# Patient Record
Sex: MALE | Race: WHITE | URBAN - METROPOLITAN AREA
[De-identification: names, ages, dates, MRNs, and addresses within clinical notes are randomized per-mention and may not be internally consistent; named-entity substitution may affect disease eponyms.]

---

## 2019-08-02 ENCOUNTER — TRANSFERRED RECORDS (OUTPATIENT)
Dept: HEALTH INFORMATION MANAGEMENT | Facility: CLINIC | Age: 29
End: 2019-08-02

## 2020-02-06 ENCOUNTER — APPOINTMENT (OUTPATIENT)
Age: 30
Setting detail: DERMATOLOGY
End: 2020-02-06

## 2020-02-06 DIAGNOSIS — R20.8 OTHER DISTURBANCES OF SKIN SENSATION: ICD-10-CM

## 2020-02-06 DIAGNOSIS — L64.8 OTHER ANDROGENIC ALOPECIA: ICD-10-CM

## 2020-02-06 PROCEDURE — OTHER COUNSELING: OTHER

## 2020-02-06 PROCEDURE — 99213 OFFICE O/P EST LOW 20 MIN: CPT

## 2020-02-06 PROCEDURE — OTHER PRESCRIPTION: OTHER

## 2020-02-06 ASSESSMENT — LOCATION SIMPLE DESCRIPTION DERM
LOCATION SIMPLE: SCALP
LOCATION SIMPLE: LEFT SCALP

## 2020-02-06 ASSESSMENT — LOCATION ZONE DERM: LOCATION ZONE: SCALP

## 2020-02-06 ASSESSMENT — LOCATION DETAILED DESCRIPTION DERM
LOCATION DETAILED: LEFT SUPERIOR PARIETAL SCALP
LOCATION DETAILED: LEFT MEDIAL FRONTAL SCALP

## 2020-02-06 NOTE — HPI: FOLLOW UP OTHER
What Is Your Reason For Requesting A Follow-Up Appointment?: Patient is here today for follow up on Finasteride, staes it continues to work great and would like refills.

## 2022-02-08 ENCOUNTER — APPOINTMENT (OUTPATIENT)
Dept: URBAN - METROPOLITAN AREA CLINIC 260 | Age: 32
Setting detail: DERMATOLOGY
End: 2022-02-09

## 2022-02-08 VITALS — HEIGHT: 73 IN | WEIGHT: 205 LBS

## 2022-02-08 DIAGNOSIS — L40.8 OTHER PSORIASIS: ICD-10-CM

## 2022-02-08 PROCEDURE — OTHER PRESCRIPTION MEDICATION MANAGEMENT: OTHER

## 2022-02-08 PROCEDURE — OTHER COUNSELING: OTHER

## 2022-02-08 PROCEDURE — OTHER MIPS QUALITY: OTHER

## 2022-02-08 PROCEDURE — OTHER PRESCRIPTION: OTHER

## 2022-02-08 PROCEDURE — 99214 OFFICE O/P EST MOD 30 MIN: CPT

## 2022-02-08 RX ORDER — CLOBETASOL PROPIONATE 0.5 MG/G
OINTMENT TOPICAL
Qty: 45 | Refills: 0 | Status: ERX | COMMUNITY
Start: 2022-02-08

## 2022-02-08 RX ORDER — CLOBETASOL PROPIONATE 0.5 MG/ML
SOLUTION TOPICAL
Qty: 50 | Refills: 0 | Status: ERX | COMMUNITY
Start: 2022-02-08

## 2022-02-08 ASSESSMENT — LOCATION ZONE DERM
LOCATION ZONE: SCALP
LOCATION ZONE: NECK
LOCATION ZONE: TRUNK
LOCATION ZONE: EAR

## 2022-02-08 ASSESSMENT — LOCATION SIMPLE DESCRIPTION DERM
LOCATION SIMPLE: LEFT ANTERIOR NECK
LOCATION SIMPLE: RIGHT EAR
LOCATION SIMPLE: GLUTEAL CLEFT
LOCATION SIMPLE: POSTERIOR SCALP
LOCATION SIMPLE: LEFT EAR

## 2022-02-08 ASSESSMENT — PGA PSORIASIS: PGA PSORIASIS 2020: MODERATE

## 2022-02-08 ASSESSMENT — LOCATION DETAILED DESCRIPTION DERM
LOCATION DETAILED: GLUTEAL CLEFT
LOCATION DETAILED: LEFT SUPERIOR ANTERIOR NECK
LOCATION DETAILED: POSTERIOR MID-PARIETAL SCALP
LOCATION DETAILED: RIGHT CYMBA CONCHA
LOCATION DETAILED: LEFT ANTIHELIX

## 2022-02-08 NOTE — PROCEDURE: PRESCRIPTION MEDICATION MANAGEMENT
Detail Level: Simple
Plan: Return if not improved in 2 weeks
Initiate Treatment: Clobetasol scalp solution and ointment
Render In Strict Bullet Format?: No

## 2022-02-08 NOTE — PROCEDURE: MIPS QUALITY
Quality 130: Documentation Of Current Medications In The Medical Record: Current Medications Documented
Quality 226: Preventive Care And Screening: Tobacco Use: Screening And Cessation Intervention: Patient screened for tobacco use and is an ex/non-smoker
Quality 110: Preventive Care And Screening: Influenza Immunization: Influenza Immunization not Administered because Patient Refused.
Quality 431: Preventive Care And Screening: Unhealthy Alcohol Use - Screening: Patient not identified as an unhealthy alcohol user when screened for unhealthy alcohol use using a systematic screening method
Detail Level: Detailed

## 2022-03-15 RX ORDER — CLOBETASOL PROPIONATE 0.5 MG/G
OINTMENT TOPICAL
Qty: 45 | Refills: 2 | Status: ERX

## 2023-04-27 ENCOUNTER — TRANSFERRED RECORDS (OUTPATIENT)
Dept: HEALTH INFORMATION MANAGEMENT | Facility: CLINIC | Age: 33
End: 2023-04-27

## 2023-10-26 ENCOUNTER — TRANSFERRED RECORDS (OUTPATIENT)
Dept: HEALTH INFORMATION MANAGEMENT | Facility: CLINIC | Age: 33
End: 2023-10-26

## 2023-11-01 ENCOUNTER — TRANSFERRED RECORDS (OUTPATIENT)
Dept: HEALTH INFORMATION MANAGEMENT | Facility: CLINIC | Age: 33
End: 2023-11-01
Payer: COMMERCIAL

## 2023-11-07 ENCOUNTER — TRANSFERRED RECORDS (OUTPATIENT)
Dept: HEALTH INFORMATION MANAGEMENT | Facility: CLINIC | Age: 33
End: 2023-11-07
Payer: COMMERCIAL

## 2023-11-16 ENCOUNTER — TRANSFERRED RECORDS (OUTPATIENT)
Dept: HEALTH INFORMATION MANAGEMENT | Facility: CLINIC | Age: 33
End: 2023-11-16
Payer: COMMERCIAL

## 2023-11-30 ENCOUNTER — TRANSFERRED RECORDS (OUTPATIENT)
Dept: HEALTH INFORMATION MANAGEMENT | Facility: CLINIC | Age: 33
End: 2023-11-30
Payer: COMMERCIAL

## 2023-12-01 ENCOUNTER — TRANSCRIBE ORDERS (OUTPATIENT)
Dept: OTHER | Age: 33
End: 2023-12-01

## 2023-12-01 DIAGNOSIS — H90.3 SENSORINEURAL HEARING LOSS, BILATERAL: Primary | ICD-10-CM

## 2023-12-05 NOTE — TELEPHONE ENCOUNTER
FUTURE VISIT INFORMATION      FUTURE VISIT INFORMATION:  Date: 2/7/2024  Time: 9 AM  Location: [facility]  REFERRAL INFORMATION:  Referring provider:  Nancy Machado MD   Referring providers clinic:  [facility]  Reason for visit/diagnosis:  H90.3 (ICD-10-CM) - Sensorineural hearing loss, bilateral, Pt was seen at [facility] and has had CT scans done with them. He will have that information sent to [employer]. Pt said he also has psoriasis and sinus infections and doesn't know if that is affecting his hearing loss. Pt made appt for [facility] location     RECORDS REQUESTED FROM      Clinic name Comments Records Status Imaging Status   [facility] 11/30/23 referral  10/26/23 and 11/7/23 OV notes- Nancy Machado MD     Audiograms: 10/26/23, 11/7/23 Scanned in [facility] 11/1/23 audiogram Scanned in [facility]    [facility] MRN: [id number] 11/16/23 MR brain IAC Scanned in Epic Pending req  Req 1/5/24  PACS           January 5, 2024 7:05 AM - image requested from [facility]  January 9, 2024 5:23 PM - Image resolved in [facility]

## 2024-02-01 DIAGNOSIS — Z01.10 ENCOUNTER FOR HEARING EXAMINATION: Primary | ICD-10-CM

## 2024-02-05 ENCOUNTER — TRANSCRIBE ORDERS (OUTPATIENT)
Dept: OTHER | Age: 34
End: 2024-02-05

## 2024-02-05 DIAGNOSIS — H93.12 TINNITUS, LEFT: ICD-10-CM

## 2024-02-05 DIAGNOSIS — H91.92 HEARING LOSS, LEFT: Primary | ICD-10-CM

## 2024-02-07 ENCOUNTER — OFFICE VISIT (OUTPATIENT)
Dept: AUDIOLOGY | Facility: CLINIC | Age: 34
End: 2024-02-07
Payer: COMMERCIAL

## 2024-02-07 ENCOUNTER — PRE VISIT (OUTPATIENT)
Dept: OTOLARYNGOLOGY | Facility: CLINIC | Age: 34
End: 2024-02-07

## 2024-02-07 ENCOUNTER — OFFICE VISIT (OUTPATIENT)
Dept: OTOLARYNGOLOGY | Facility: CLINIC | Age: 34
End: 2024-02-07
Payer: COMMERCIAL

## 2024-02-07 VITALS
TEMPERATURE: 98.5 F | BODY MASS INDEX: 28.23 KG/M2 | SYSTOLIC BLOOD PRESSURE: 153 MMHG | HEIGHT: 73 IN | WEIGHT: 213 LBS | HEART RATE: 79 BPM | DIASTOLIC BLOOD PRESSURE: 93 MMHG | OXYGEN SATURATION: 99 %

## 2024-02-07 DIAGNOSIS — H90.A22 SENSORINEURAL HEARING LOSS (SNHL) OF LEFT EAR WITH RESTRICTED HEARING OF RIGHT EAR: ICD-10-CM

## 2024-02-07 DIAGNOSIS — Z01.10 ENCOUNTER FOR HEARING EXAMINATION: ICD-10-CM

## 2024-02-07 DIAGNOSIS — H90.3 SENSORINEURAL HEARING LOSS, BILATERAL: Primary | ICD-10-CM

## 2024-02-07 PROCEDURE — 92557 COMPREHENSIVE HEARING TEST: CPT | Performed by: AUDIOLOGIST

## 2024-02-07 PROCEDURE — 92565 STENGER TEST PURE TONE: CPT | Performed by: AUDIOLOGIST

## 2024-02-07 PROCEDURE — 99243 OFF/OP CNSLTJ NEW/EST LOW 30: CPT | Performed by: OTOLARYNGOLOGY

## 2024-02-07 PROCEDURE — 92550 TYMPANOMETRY & REFLEX THRESH: CPT | Performed by: AUDIOLOGIST

## 2024-02-07 RX ORDER — FINASTERIDE 1 MG/1
1 TABLET, FILM COATED ORAL EVERY MORNING
COMMUNITY

## 2024-02-07 ASSESSMENT — PAIN SCALES - GENERAL: PAINLEVEL: NO PAIN (0)

## 2024-02-07 NOTE — PROGRESS NOTES
AUDIOLOGY REPORT    SUMMARY: Audiology visit completed. See audiogram for results.      RECOMMENDATIONS: Follow-up with ENT.    Diane Connolly, [facility]  Licensed Audiologist  [place] #[id number]

## 2024-02-07 NOTE — PATIENT INSTRUCTIONS
You were seen in the ENT Clinic today by Dr. Pastor. If you have any questions or concerns after your appointment, please contact us (see below)      2.   Please let us know if you'd like to proceed with cochlear implant. Below outlines your next steps and surgery information if you plan to go forward with surgery.        Cochlear Implant Pre-Operative and Post-Operative Instructions:     Our surgery schedulers, will contact you with a potential date for surgery after obtaining prior authorization for cochlear implant surgery. The Prior Authorization Process can take 4-6 weeks, and schedulers are unable to call you until it is finalized through this process.        PRE-PROCEDURE HIGHLIGHTS: COCHLEAR IMPLANT     1.PNEUMOCOCCAL VACCINATIONS - summary below:  Meningitis is an infection of the fluid that surrounds the brain and spinal cord. There are two main types of meningitis, viral and bacterial. Bacterial meningitis is the more serious type and the type that has been reported in individuals with cochlear implants. The symptoms, treatment, and outcomes may differ depending on the cause of the meningitis. Meningitis in individuals with cochlear implants is most commonly caused by the bacterium Streptococcus pneumoniae (pneumococcus). Pneumococcal vaccination protects against Streptococcus pneumoniae (commonly referred to as pneumococcus), the bacterium most commonly responsible for meningitis after cochlear implant surgery. Although extremely rare, meningitis has been reported worldwide in patients with cochlear implants, and can be immensely serious. To minimize the risk of infection, we require all cochlear implant patients to receive age-appropriate pneumococcal vaccination and to provide us with proof of vaccination. We are taking these precautions in accordance with guidelines established by the Food and Drug Administration (FDA), Center for Disease Control (CDC), and state health departments.     When should I  get the vaccines?  Your age and shot history affect which vaccine you should get and when. Talk to your provider. The vaccine guidelines that we follow are from the Centers for Disease Control (CDC).      Vaccine guidelines for adults getting a cochlear implant:  If you have never had a pneumococcal vaccine, you should get the PCV20 (Fjpqpvx42) or PCV15 (Vaxneuvance).   If you get the PCV20, you do not need any other vaccines.   If you get the PCV15, you will need a second vaccine (the Pneumovax 23) at least 8 weeks later.   If you have previously received a dose of the Pneumovax 23:   You should get 1 dose of the PCV20 (Jlxkkjf93) or PCV15 (Vaxneuvance) at least 1 year after receiving the Pneumovax 23.   If you have previously received a dose of the Uyfweqb34:   You should get 1 dose of the Iyubrlsvx36 at least 8 weeks after receiving the Enoyvdm53.   If you have previously received both the Ccrfaghqr04 and Iiepzii34:   You do not need any other vaccines. Together, Zfinzlw11 and Uadyboqlh36 meet the meningitis vaccine guidelines for cochlear implant     **Finishing the vaccination process is important, but may need to be finished after surgery due to the recommended vaccine schedule- may need to be one year apart (see vaccine handout above).     To learn more:  Talk to your provider. You can also visit these CDC websites:  https://www.cdc.gov/vaccines/vpd/pneumo/public/index.html  https://www.cdc.gov/vaccines/vpd/pneumo/hcp/xhq-zrzt-qs-vaccinate.html  http://www.cdc.gov/vaccines/vpd-vac/mening/cochlear/yva-kppownpu-tif-hcp.htm         **Please have your clinic fax us the proof of immunizations  [phone].Cochlear implant candidates must have completed at least part of the immunization requirements prior to surgery. Full compliance with the full immunization schedule must be documented as soon as possible.        2. HISTORY AND PHYSICAL: You must have a physical exam (called  history and physical ) within 30 days  of surgery. You can do this at the Nurse Practitioner Clinic here at the [facility] or your family clinic. Your doctor will let you know if you need to have this physical completed by our PAC (Preoperative Assessment Center) team at the [facility]. Bring the paper copy of the Pre-Op Surgery Form with you to your primary doctor if they are outside of the [facility] system, as this paper form includes our fax number.     3. NO MOTRIN, IBUPROFEN, ASPIRIN, ALEVE, GARLIC SUPPLEMENTS or FISH OIL x 7 days prior to surgery ( to prevent excess bleeding and bruising at time of surgery)     4. Review written material in Cochlear implant surgery teaching packet and always feel free contact us for further questions.      5. Audiology appointments as recommended before surgery (example: device selection appointment)     6. Pre-op Shower: Take a bath or shower the night before and the morning of surgery (refer to surgery packet for extra information). You should use the soap that we mailed to you or gave in clinic (2 small bottles). Use the entire bottle of soap for each shower, washing from the neck down, then rinsing off. Sleep in clean clothing and use clean bedding sheets. If your doctor does not give you special soap, buy Hibiclens or Shannan-Stat at the drug store or ask the pharmacist to suggest a brand. Do not put on lotion, powder, perfume, deodorant or make-up after bathing.        What else should I know about the surgery?  Any hearing you had in the implant ear may be gone after surgery.  You will depend upon a cochlear implant for the rest of your life  If the implant is removed, you will not regain the hearing you had before surgery.  New bone could grow and push your cochlear implant out of place. If this happens, you will need another surgery.  The skin at the site could get infected or wear away. If this happens, we may have to remove the implant. It may be possible to re-implant the device.  The device may stop working. If this  happens, we may be able to replace it. We do not yet know the average lifetime of cochlear implants.  If you need a medical procedure done the head or neck, talk to your surgeon first.   If you need an MRI, you must first see your surgeon. If the implant is exposed to an MRI device, it can harm the implant or the person. We suggest you get a medical ID bracelet stating you have a cochlear implant.      We may not do the implant if we find:   A leak from your cerebrospinal fluid (fluid from the spinal cord and brain)  The cochlea is filled in with bone.  It is too hard to reach the cochlea.         POST SURGERY CARE/HIGHLIGHTS: COCHLEAR IMPLANT      1.   What to expect after surgery:  Nausea: Some people feel very sick and others do not. If it is severe, you may stay overnight.  Dizziness: This may be mild. If it is severe, you may stay overnight.  Tasting blood or coughing up a small amount of blood: This is normal.  Sore throat: Your throat may be scratchy from the breathing tube used during surgery.  Sore neck: Your neck may be sore because, during surgery, your head was turned to one side for an extended period of time.  Metal taste in the mouth: This may happen if the taste nerve was injured during surgery. The bad taste may last up to a couple of months.  Roaring in the ears or tinnitus: This is common and may go away with time.  Mild or generalized swelling: This will go down slowly over 2 months.  Numbness: Your ear will be numb. Gradually, feeling will return. Sometimes the very top of the ear remains numb.     **Most of the effects of surgery should go away within one to two weeks. Some effects could be permanent.        2.  Pain/Medication:  If you have pain, you may take Tylenol. Your doctor may also prescribe pain medicine. This medicine may cause constipation.  You should have a bowel movement within three days of surgery.  If not, ask your pharmacist about an over the counter stool softener.        3.    Wound care:    You can remove your head dressing in 2 days.    Leave the steri-strips (band aids) in place- they will fall off on their own in 10-12 days- you may trim the edges as they loosen.    Dry Ear Precautions - prior to shower: gently place cotton ball into ear canal, cover external portion with Vaseline to form a seal and repel water. Remove cotton ball afterward.  Do not shower until 48 hours after surgery. It is best to shower with a shower cap in place and then use a cup to cover the ear to wash hair over the sink. Do not let your incision be soaked with water until the follow up appointment. Keep incision clean and dry, do not scrub, pat to dry.  After 48 hours, you may wash your hair with gentle rubbing. Rub along the incision and do not pull against the incision line. Wait 6-8 weeks before you color or perm your hair.  If you wear glasses, please remove the ear piece on the surgical side until post op visit with provider. Thi will avoid pressure near the incision while is heals, and healing takes about 2-3 weeks. (Check with nurse if any questions.)        4. Activity Restrictions:  For the next 2 weeks, no heavy lifting more than 5 pounds, no strenuous activities. No nose blowing. Sneeze with your mouth open. Stool softeners as needed to avoid straining with bowel movement. Avoid activities which cause you to bear down or strain, or increase head pressure until first postop (possibly longer)  Keep head of bed up with 1-2 pillows for about 1 week after surgery. Avoid laying flat in bed.     5. Generally, return to work is 1-2 weeks for employment that hearing is not required or has been adapted for hearing loss. Use of implant in the workplace will come in time with cochlear programming/ rehabilitation, each individual has a slightly different rehabilitation course, which will also be dictated by recovery from surgery and your surgeon's recommendations.      6. One to Three weeks after surgery, you  will have a follow- up visit with your surgeon. If you are not scheduled, please call the ENT clinic at [phone].         **When to call us:  Call your surgeon right away if your face feels weak after surgery  Clear fluid coming from your nose or the incision  Redness, pain or any drainage from the incision  Swelling of the wound (some generalized swelling is normal)  Pain that is uncontrolled with medication  Pain or cramping in your legs  A fever of 100 F (37.7 C) for 24 hours or longer  Severe dizziness or problems with balance  Sensitivity along the incision line due to the dissolvable stitches: if you notice dryness, crust or breakdown of any kind along the incision line, please call the clinic for instructions. In most cases, this will go away within 3-4 weeks.         How to Contact Us:  Send a [employer] message to your provider. Our team will respond to you via [employer]. Occasionally, we will need to call you to get further information.  For urgent matters (Monday-Friday), call the ENT Clinic: [phone] and speak with a call center team member - they will route your call appropriately.   If you'd like to speak directly with a nurse, please find our contact information below. We do our best to check voicemail frequently throughout the day, and will work to call you back within 1-2 days. For urgent matters, please use the general clinic phone numbers listed above.      Linda LEVY RN  ENT RN Care Coordinator  Direct: [phone]    Drea ZAMORA LPN  Direct: [phone]

## 2024-02-07 NOTE — LETTER
2/7/2024       RE: Edgard Stephens  [address] [address]     Dear Colleague,    Thank you for referring your patient, Edgard Stephens, to the [facility] [place] [facility]. Please see a copy of my visit note below.    Edgard Stephens is seen in consultation from Dr. Machado of [facility].  He is a 33 year old male being seen for hearing loss evaluation. 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 He has a history of ASNHL of the left ear since 2016, but one month following the shot gun noise exposure, he noticed a decrease in his hearing and constant ringing in his right ear. A previous MRI in 2016 was normal.     Today, he reports he noticed the hearing in his right ear went down last year. At his last visit at [facility], he was told he may have had Meniere's disease, which increased his paranoia for 6 months afterwards. He admits that he didn't take great care with his ears in the 2010s, with wearing no ear protection with continuous gun noise exposure. He tried hearing aids last year, but felt like there was no change or improvement to his hearing. He notices his left hearing aid displaying more issues, as he does play hockey, and the noise can be unbearable.     Other than his grandfather, he has no family history of hearing loss and no history of young adult/childhood hearing loss. He denies a history of ear problems, even as a child. No otalgia, otorrhea, vertigo or balance problems.      Social History     Tobacco Use    Smoking status: Never    Smokeless tobacco: Current     Types: Chew    Tobacco comments:     Chew one time daily    Substance Use Topics    Alcohol use: Yes     Comment: 0-1 a week    Drug use: No       Patient Supplied Answers to Review of Systems      2/7/2024     7:00 AM    ENT ROS   Psychology Frequently feeling anxious   Ears, Nose, Throat Hearing  loss    Ringing/noise in ears   The remainder of the 10 point review of systems is otherwise negative.    Physical examination:  Constitutional:  In no acute distress, appears stated age  Eyes:  Extraocular movements intact, no spontaneous nystagmus  Ears:  Both ears examined with the otoscope.  TMs intact, middle ears aerated, ear canals clear.   Respiratory:  No increased work of breathing, wheezing or stridor  Musculoskeletal:  Good upper extremity strength  Skin:  No rashes on the head and neck  Neurologic:  House Brackman 1/6 bilaterally, ambulating normally  Psychiatric:  Alert, normal affect, answering questions appropriately    Audiogram:  Audiogram on 2/7/24 was independently reviewed and compared to multiple outside audiograms. Right mild/moderate low frequency sensorineural hearing loss rising to normal by 1kHz, 100% speech discrimination. Left moderate to severe sensorineural thresholds but with 20% speech discrimination at 90dB. Normal tympanograms. Absent left reflexes, present right.  11/7/23 with similar thresholds but 52% speech discrimination at 85dB.  11/1/23 similar, speech discrimination unable to be read due to poor quality.  10/26/23 similar with 28% speech discrimination at 85dB.  4/27/23 similar with 20% speech discrimination at 80dB.  7/7/16 with normal right hearing, left normal downsloping to moderate/severe thresholds, no speech discrimination done.  3/26/16 with normal right hearing, left mild to moderate/severe thresholds, no speech discrimination done.  11/5/15 test showed normal right hearing, left normal downsloping to moderate/severe sensorineural hearing loss.          Imaging: MRI was independently reviewed. No enhancing lesions in either IAC/CPA.  MRI brain and IACs 11/17/23  No abnormal tissue or enhancement within the internal auditory canals. 5mm pineal cyst    Outside records from [facility] were independently reviewed and summarized above.    Assessment and plan:    Edgard  Mark Stephens is seen in consultation from Dr. Machado of [facility]. He is a 33 year old male being seen for hearing loss evaluation. He has had left hearing loss since at least 2015 although no speech discrimination was done in 2015 or 2016. His right hearing dropped in 2023 but still with excellent speech discrimination. He has had evaluation and treatment with no change in hearing. I reassured him that, without any hearing fluctuation or vertigo, he likely does not have Meniere's disease in either ear. We discussed hearing rehabilitation options. Given the poor speech discrimination on the left, BiCROS or CROS hearing aids were recommended. Additionally, we went over the possibility of left cochlear implantation. He is not quite ready to consider a cochlear implant but he is interested in trialing a BiCROS. We will place a hearing aid consultation with audiology.    Scribe Disclosure:   I, Yessica Mars, am serving as a scribe; to document services personally performed by Michelle Pastor MD -based on data collection and the provider's statements to me.     Provider Disclosure:  I agree with above History, Review of Systems, Physical exam and Plan.  I have reviewed the content of the documentation and have edited it as needed. I have personally performed the services documented here and the documentation accurately represents those services and the decisions I have made.      Again, thank you for allowing me to participate in the care of your patient.      Sincerely,    Michelle Pastor MD

## 2024-02-07 NOTE — PROGRESS NOTES
Edgard Stephens is seen in consultation from Dr. Machado of [facility].  He is a 33 year old male being seen for hearing loss evaluation. 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 He has a history of ASNHL of the left ear since 2016, but one month following the shot gun noise exposure, he noticed a decrease in his hearing and constant ringing in his right ear. A previous MRI in 2016 was normal.     Today, he reports he noticed the hearing in his right ear went down last year. At his last visit at [facility], he was told he may have had Meniere's disease, which increased his paranoia for 6 months afterwards. He admits that he didn't take great care with his ears in the 2010s, with wearing no ear protection with continuous gun noise exposure. He tried hearing aids last year, but felt like there was no change or improvement to his hearing. He notices his left hearing aid displaying more issues, as he does play hockey, and the noise can be unbearable.     Other than his grandfather, he has no family history of hearing loss and no history of young adult/childhood hearing loss. He denies a history of ear problems, even as a child. No otalgia, otorrhea, vertigo or balance problems.      Social History     Tobacco Use    Smoking status: Never    Smokeless tobacco: Current     Types: Chew    Tobacco comments:     Chew one time daily    Substance Use Topics    Alcohol use: Yes     Comment: 0-1 a week    Drug use: No       Patient Supplied Answers to Review of Systems      2/7/2024     7:00 AM    ENT ROS   Psychology Frequently feeling anxious   Ears, Nose, Throat Hearing loss    Ringing/noise in ears   The remainder of the 10 point review of systems is otherwise negative.    Physical examination:  Constitutional:  In no acute distress, appears stated age  Eyes:  Extraocular movements intact, no spontaneous nystagmus  Ears:  Both ears examined with the otoscope.  TMs intact, middle ears  aerated, ear canals clear.   Respiratory:  No increased work of breathing, wheezing or stridor  Musculoskeletal:  Good upper extremity strength  Skin:  No rashes on the head and neck  Neurologic:  House Brackman 1/6 bilaterally, ambulating normally  Psychiatric:  Alert, normal affect, answering questions appropriately    Audiogram:  Audiogram on 2/7/24 was independently reviewed and compared to multiple outside audiograms. Right mild/moderate low frequency sensorineural hearing loss rising to normal by 1kHz, 100% speech discrimination. Left moderate to severe sensorineural thresholds but with 20% speech discrimination at 90dB. Normal tympanograms. Absent left reflexes, present right.  11/7/23 with similar thresholds but 52% speech discrimination at 85dB.  11/1/23 similar, speech discrimination unable to be read due to poor quality.  10/26/23 similar with 28% speech discrimination at 85dB.  4/27/23 similar with 20% speech discrimination at 80dB.  7/7/16 with normal right hearing, left normal downsloping to moderate/severe thresholds, no speech discrimination done.  3/26/16 with normal right hearing, left mild to moderate/severe thresholds, no speech discrimination done.  11/5/15 test showed normal right hearing, left normal downsloping to moderate/severe sensorineural hearing loss.          Imaging: MRI was independently reviewed. No enhancing lesions in either IAC/CPA.  MRI brain and IACs 11/17/23  No abnormal tissue or enhancement within the internal auditory canals. 5mm pineal cyst    Outside records from [facility] were independently reviewed and summarized above.    Assessment and plan:    Edgard Stephens is seen in consultation from Dr. Machado of [facility]. He is a 33 year old male being seen for hearing loss evaluation. He has had left hearing loss since at least 2015 although no speech discrimination was done in 2015 or 2016. His right hearing dropped in 2023 but still with excellent speech discrimination.  He has had evaluation and treatment with no change in hearing. I reassured him that, without any hearing fluctuation or vertigo, he likely does not have Meniere's disease in either ear. We discussed hearing rehabilitation options. Given the poor speech discrimination on the left, BiCROS or CROS hearing aids were recommended. Additionally, we went over the possibility of left cochlear implantation. He is not quite ready to consider a cochlear implant but he is interested in trialing a BiCROS. We will place a hearing aid consultation with audiology.    Scribe Disclosure:   I, Yessica Mars, am serving as a scribe; to document services personally performed by Michelle Pastor MD -based on data collection and the provider's statements to me.     Provider Disclosure:  I agree with above History, Review of Systems, Physical exam and Plan.  I have reviewed the content of the documentation and have edited it as needed. I have personally performed the services documented here and the documentation accurately represents those services and the decisions I have made.

## 2024-02-07 NOTE — NURSING NOTE
"Chief Complaint   Patient presents with    Consult     Sensorineural hearing loss      Blood pressure (!) 153/93, pulse 79, temperature 98.5  F (36.9  C), height 1.854 m (6' 1\"), weight 96.6 kg (213 lb), SpO2 99%.  Sumanth Menchaca LPN    "

## 2024-02-12 ENCOUNTER — TELEPHONE (OUTPATIENT)
Dept: AUDIOLOGY | Facility: CLINIC | Age: 34
End: 2024-02-12
Payer: COMMERCIAL

## 2024-02-12 DIAGNOSIS — H90.A22 SENSORINEURAL HEARING LOSS (SNHL) OF LEFT EAR WITH RESTRICTED HEARING OF RIGHT EAR: Primary | ICD-10-CM

## 2024-03-12 ENCOUNTER — APPOINTMENT (OUTPATIENT)
Dept: URBAN - METROPOLITAN AREA CLINIC 253 | Age: 34
Setting detail: DERMATOLOGY
End: 2024-03-20

## 2024-03-12 VITALS — WEIGHT: 210 LBS | HEIGHT: 73 IN | RESPIRATION RATE: 14 BRPM

## 2024-03-12 DIAGNOSIS — L40.0 PSORIASIS VULGARIS: ICD-10-CM

## 2024-03-12 PROCEDURE — OTHER ADDITIONAL NOTES: OTHER

## 2024-03-12 PROCEDURE — OTHER MIPS QUALITY: OTHER

## 2024-03-12 PROCEDURE — 99214 OFFICE O/P EST MOD 30 MIN: CPT

## 2024-03-12 PROCEDURE — OTHER PRESCRIPTION: OTHER

## 2024-03-12 PROCEDURE — OTHER COUNSELING: OTHER

## 2024-03-12 RX ORDER — CLOBETASOL PROPIONATE 0.5 MG/G
0.05% OINTMENT TOPICAL BID
Qty: 60 | Refills: 2 | Status: ERX

## 2024-03-12 RX ORDER — PREDNISONE 20 MG/1
TABLET ORAL
Qty: 33 | Refills: 0 | Status: ERX | COMMUNITY
Start: 2024-03-12

## 2024-03-12 RX ORDER — HYDROXYZINE HYDROCHLORIDE 10 MG/1
10 MG TABLET, FILM COATED ORAL
Qty: 60 | Refills: 0 | Status: ERX | COMMUNITY
Start: 2024-03-12

## 2024-03-12 ASSESSMENT — LOCATION SIMPLE DESCRIPTION DERM
LOCATION SIMPLE: ABDOMEN
LOCATION SIMPLE: LEFT PRETIBIAL REGION
LOCATION SIMPLE: RIGHT PRETIBIAL REGION
LOCATION SIMPLE: LEFT UPPER ARM
LOCATION SIMPLE: RIGHT ELBOW
LOCATION SIMPLE: CHEST

## 2024-03-12 ASSESSMENT — LOCATION DETAILED DESCRIPTION DERM
LOCATION DETAILED: RIGHT PROXIMAL PRETIBIAL REGION
LOCATION DETAILED: LEFT PROXIMAL PRETIBIAL REGION
LOCATION DETAILED: RIGHT RIB CAGE
LOCATION DETAILED: RIGHT ANTECUBITAL SKIN
LOCATION DETAILED: LEFT ANTERIOR DISTAL UPPER ARM
LOCATION DETAILED: STERNUM

## 2024-03-12 ASSESSMENT — BSA PSORIASIS: % BODY COVERED IN PSORIASIS: 20

## 2024-03-12 ASSESSMENT — LOCATION ZONE DERM
LOCATION ZONE: ARM
LOCATION ZONE: LEG
LOCATION ZONE: TRUNK

## 2024-03-12 NOTE — PROCEDURE: ADDITIONAL NOTES
Render Risk Assessment In Note?: no
Detail Level: Simple
Additional Notes: The patient reports that he always flares in the winter, though his psoriasis does not flare year-round. We will initiate a prednisone taper today and hydroxyzine for itching. If not improved in 1 month, we will consider biologic therapy options.

## 2025-08-18 ENCOUNTER — TELEPHONE (OUTPATIENT)
Dept: AUDIOLOGY | Facility: CLINIC | Age: 35
End: 2025-08-18
Payer: COMMERCIAL

## 2025-09-11 ENCOUNTER — PRE VISIT (OUTPATIENT)
Dept: AUDIOLOGY | Facility: CLINIC | Age: 35
End: 2025-09-11